# Patient Record
Sex: MALE | Race: BLACK OR AFRICAN AMERICAN | Employment: FULL TIME | URBAN - METROPOLITAN AREA
[De-identification: names, ages, dates, MRNs, and addresses within clinical notes are randomized per-mention and may not be internally consistent; named-entity substitution may affect disease eponyms.]

---

## 2018-03-11 NOTE — ED INITIAL ASSESSMENT (HPI)
Pt has a history of bipolar disorder and he feels as though a manic phase of his disorder is happening. He is not sleeping well, only 2-3 hour last night. He was hospitalized in 2012 for mental illness.

## 2018-03-12 PROBLEM — F31.74 BIPOLAR 1 DISORDER, MANIC, FULL REMISSION (HCC): Status: ACTIVE | Noted: 2018-03-12

## 2018-03-12 NOTE — ED NOTES
[facility] ambulance they let me know it will be an additional hour, let them know know it was inappropriate that we never received a call regarding delay.  They will look to turnover

## 2018-03-12 NOTE — ED NOTES
Miley from [facility] at bedside updating pt and family of the POC, plan to admit to [facility]

## 2018-03-12 NOTE — ED NOTES
Called [facility] dept, spoke to Reynaldo. She was updated of pt status/meds given

## 2018-03-12 NOTE — BH LEVEL OF CARE ASSESSMENT
Level of Care Assessment Note    General Questions  Why are you here?: \"I have had bipolar disorder for awhile. \"    Precipitating Events: Pt stated that his wife and friends \"collectively\" decided that he needed to be evaluated.  Pt stated that he was b snap-chatted himself driving [id number] on Friday.   Pt has been texting other females with sexual content-specifically to family friends and friends wives, emailed a dealership about a new car and then when he got an autoreply he sent a vulgar reply saying angi Wife stated that pt stated in 2012 \"I don't understand why I could have a beautiful baby and beautiful wife and not want to live anymore. \"      Danger to Others/Property  Current/Recent Harm Toward Others: No  Past Harm Toward Others: No  Current or Past falling asleep  Number of Sleep Hours: 3 Hours  Use of Sleep Aids: 100mg trazadone. Pt stated that he has been taking double dose of trazadone.     Appetite Symptoms:  (MARCELINO)  Unplanned Weight Loss:  (MARCELINO)  Unplanned Weight Gain:  (MARCELINO)  History of Eating Lennette Long any of the following behaviors over the past 30 days?: Denies                                              Functional Impairment  Currently Attending School: No  Employment Status: Employed  Job Issues:  (Per pt's wife, no work problems at this time.  )  C Excited;Disorganized;Racing;Flight of ideas;Perseveration;Pressured; Tangential  Content: Grandiose  Level of Consciousness: Alert  Level of Consciousness: Alert  Behavior  Exhibited behavior: Hyperactive; Unable to participate    Assessment Summary  Assessm recently been sober) and admitted to smoking marijuana daily. He became very agittated at this writer when explaining assigned behavioral health unit.   Pt currently is being treated by Dr. Jeanne Pickett on an outpatient basis and Dr. Jeanne Pickett stated that they have bee

## 2018-03-12 NOTE — ED NOTES
Rounded on pt, he remains asleep. Pt opens eyes to verbal stimuli, falls back to sleep. Resps easy, regular.  Pt awaiting [facility] transport to [facility]

## 2018-03-12 NOTE — ED NOTES
Pt noted hyperverbal, yelling loudly, banging on the door, not compliant with verbal cues. Security at bedside.  Pt given Haldol 5 mg IV, see MAR

## 2018-03-12 NOTE — ED NOTES
Patient was started on seclusion per shannon ledezma. Belongings placed in two locked bags ([id number] & [id number]) Wife took home cell phone and .

## 2018-03-12 NOTE — ED PROVIDER NOTES
Patient Seen in: [facility] Emergency Department    History   Patient presents with:  Eval-P (psychiatric)    Stated Complaint: eval P  --hx of bipolar starting to feel manic    HPI    Patient is a 65-year-old male who presents emergency room with a (Temporal)   Resp 18   Ht 189.2 cm (6' 2.5\")   Wt 89.4 kg   SpO2 100%   BMI 24.95 kg/m²         Physical Exam  GENERAL: Well-developed, well-nourished male sitting up breathing easily in no apparent distress. Patient is nontoxic in appearance.   HEENT: He DIFFERENTIAL WITH PLATELET    Narrative: The following orders were created for panel order CBC WITH DIFFERENTIAL WITH PLATELET.   Procedure                               Abnormality         Status                     ---------

## 2021-03-26 RX ORDER — ACETAMINOPHEN 500 MG
1000 TABLET ORAL ONCE
Status: CANCELLED | OUTPATIENT
Start: 2021-03-26 | End: 2021-03-26

## 2021-03-26 NOTE — H&P (VIEW-ONLY)
Prakash Collins is a 31year old male. Patient referred from Physician Nonstaff.        Patient presents for complaints of pain and a bulge in the midline just above the umbilical region  It is non reducible    Past Medical History:   Diagnosis Date   • Bipolar

## 2021-04-14 ENCOUNTER — LAB ENCOUNTER (OUTPATIENT)
Dept: LAB | Age: 36
End: 2021-04-14
Attending: SURGERY
Payer: COMMERCIAL

## 2021-04-14 DIAGNOSIS — Z01.818 PRE-OP TESTING: ICD-10-CM

## 2021-04-16 ENCOUNTER — ANESTHESIA (OUTPATIENT)
Dept: SURGERY | Facility: HOSPITAL | Age: 36
End: 2021-04-16
Payer: COMMERCIAL

## 2021-04-16 ENCOUNTER — ANESTHESIA EVENT (OUTPATIENT)
Dept: SURGERY | Facility: HOSPITAL | Age: 36
End: 2021-04-16
Payer: COMMERCIAL

## 2021-04-16 ENCOUNTER — HOSPITAL ENCOUNTER (OUTPATIENT)
Facility: HOSPITAL | Age: 36
Setting detail: HOSPITAL OUTPATIENT SURGERY
Discharge: HOME | End: 2021-04-16
Attending: SURGERY | Admitting: SURGERY
Payer: COMMERCIAL

## 2021-04-16 VITALS
HEIGHT: 74 IN | OXYGEN SATURATION: 100 % | DIASTOLIC BLOOD PRESSURE: 75 MMHG | TEMPERATURE: 97 F | SYSTOLIC BLOOD PRESSURE: 112 MMHG | RESPIRATION RATE: 16 BRPM | BODY MASS INDEX: 28.88 KG/M2 | WEIGHT: 225 LBS | HEART RATE: 45 BPM

## 2021-04-16 DIAGNOSIS — K43.9 EPIGASTRIC HERNIA: ICD-10-CM

## 2021-04-16 DIAGNOSIS — Z01.818 PRE-OP TESTING: Primary | ICD-10-CM

## 2021-04-16 PROCEDURE — 0WUF0JZ SUPPLEMENT ABDOMINAL WALL WITH SYNTHETIC SUBSTITUTE, OPEN APPROACH: ICD-10-PCS | Performed by: SURGERY

## 2021-04-16 DEVICE — VENTRALEX ST HERNIA PATCH
Type: IMPLANTABLE DEVICE | Site: ABDOMEN | Status: FUNCTIONAL
Brand: VENTRALEX ST HERNIA PATCH

## 2021-04-16 RX ORDER — LIDOCAINE HYDROCHLORIDE AND EPINEPHRINE 10; 10 MG/ML; UG/ML
INJECTION, SOLUTION INFILTRATION; PERINEURAL AS NEEDED
Status: DISCONTINUED | OUTPATIENT
Start: 2021-04-16 | End: 2021-04-16 | Stop reason: HOSPADM

## 2021-04-16 RX ORDER — SODIUM CHLORIDE, SODIUM LACTATE, POTASSIUM CHLORIDE, CALCIUM CHLORIDE 600; 310; 30; 20 MG/100ML; MG/100ML; MG/100ML; MG/100ML
INJECTION, SOLUTION INTRAVENOUS CONTINUOUS
Status: DISCONTINUED | OUTPATIENT
Start: 2021-04-16 | End: 2021-04-16

## 2021-04-16 RX ORDER — HYDROCODONE BITARTRATE AND ACETAMINOPHEN 5; 325 MG/1; MG/1
2 TABLET ORAL AS NEEDED
Status: DISCONTINUED | OUTPATIENT
Start: 2021-04-16 | End: 2021-04-16

## 2021-04-16 RX ORDER — HYDROMORPHONE HYDROCHLORIDE 1 MG/ML
0.4 INJECTION, SOLUTION INTRAMUSCULAR; INTRAVENOUS; SUBCUTANEOUS EVERY 5 MIN PRN
Status: DISCONTINUED | OUTPATIENT
Start: 2021-04-16 | End: 2021-04-16

## 2021-04-16 RX ORDER — MEPERIDINE HYDROCHLORIDE 25 MG/ML
12.5 INJECTION INTRAMUSCULAR; INTRAVENOUS; SUBCUTANEOUS AS NEEDED
Status: DISCONTINUED | OUTPATIENT
Start: 2021-04-16 | End: 2021-04-16

## 2021-04-16 RX ORDER — ONDANSETRON 2 MG/ML
4 INJECTION INTRAMUSCULAR; INTRAVENOUS AS NEEDED
Status: DISCONTINUED | OUTPATIENT
Start: 2021-04-16 | End: 2021-04-16

## 2021-04-16 RX ORDER — MIDAZOLAM HYDROCHLORIDE 1 MG/ML
1 INJECTION INTRAMUSCULAR; INTRAVENOUS EVERY 5 MIN PRN
Status: DISCONTINUED | OUTPATIENT
Start: 2021-04-16 | End: 2021-04-16

## 2021-04-16 RX ORDER — DEXAMETHASONE SODIUM PHOSPHATE 4 MG/ML
VIAL (ML) INJECTION AS NEEDED
Status: DISCONTINUED | OUTPATIENT
Start: 2021-04-16 | End: 2021-04-16 | Stop reason: SURG

## 2021-04-16 RX ORDER — HYDROCODONE BITARTRATE AND ACETAMINOPHEN 5; 325 MG/1; MG/1
1 TABLET ORAL AS NEEDED
Status: DISCONTINUED | OUTPATIENT
Start: 2021-04-16 | End: 2021-04-16

## 2021-04-16 RX ORDER — MIDAZOLAM HYDROCHLORIDE 1 MG/ML
INJECTION INTRAMUSCULAR; INTRAVENOUS AS NEEDED
Status: DISCONTINUED | OUTPATIENT
Start: 2021-04-16 | End: 2021-04-16 | Stop reason: SURG

## 2021-04-16 RX ORDER — HYDROMORPHONE HYDROCHLORIDE 1 MG/ML
INJECTION, SOLUTION INTRAMUSCULAR; INTRAVENOUS; SUBCUTANEOUS
Status: COMPLETED
Start: 2021-04-16 | End: 2021-04-16

## 2021-04-16 RX ORDER — BUPIVACAINE HYDROCHLORIDE 5 MG/ML
INJECTION, SOLUTION EPIDURAL; INTRACAUDAL AS NEEDED
Status: DISCONTINUED | OUTPATIENT
Start: 2021-04-16 | End: 2021-04-16 | Stop reason: HOSPADM

## 2021-04-16 RX ORDER — ROCURONIUM BROMIDE 10 MG/ML
INJECTION, SOLUTION INTRAVENOUS AS NEEDED
Status: DISCONTINUED | OUTPATIENT
Start: 2021-04-16 | End: 2021-04-16 | Stop reason: SURG

## 2021-04-16 RX ORDER — NEOSTIGMINE METHYLSULFATE 1 MG/ML
INJECTION INTRAVENOUS AS NEEDED
Status: DISCONTINUED | OUTPATIENT
Start: 2021-04-16 | End: 2021-04-16 | Stop reason: SURG

## 2021-04-16 RX ORDER — ONDANSETRON 2 MG/ML
INJECTION INTRAMUSCULAR; INTRAVENOUS AS NEEDED
Status: DISCONTINUED | OUTPATIENT
Start: 2021-04-16 | End: 2021-04-16 | Stop reason: SURG

## 2021-04-16 RX ORDER — METOCLOPRAMIDE HYDROCHLORIDE 5 MG/ML
10 INJECTION INTRAMUSCULAR; INTRAVENOUS AS NEEDED
Status: DISCONTINUED | OUTPATIENT
Start: 2021-04-16 | End: 2021-04-16

## 2021-04-16 RX ORDER — KETOROLAC TROMETHAMINE 30 MG/ML
INJECTION, SOLUTION INTRAMUSCULAR; INTRAVENOUS AS NEEDED
Status: DISCONTINUED | OUTPATIENT
Start: 2021-04-16 | End: 2021-04-16 | Stop reason: SURG

## 2021-04-16 RX ORDER — CEFAZOLIN SODIUM/WATER 2 G/20 ML
2 SYRINGE (ML) INTRAVENOUS ONCE
Status: COMPLETED | OUTPATIENT
Start: 2021-04-16 | End: 2021-04-16

## 2021-04-16 RX ORDER — GLYCOPYRROLATE 0.2 MG/ML
INJECTION, SOLUTION INTRAMUSCULAR; INTRAVENOUS AS NEEDED
Status: DISCONTINUED | OUTPATIENT
Start: 2021-04-16 | End: 2021-04-16 | Stop reason: SURG

## 2021-04-16 RX ORDER — LIDOCAINE HYDROCHLORIDE 10 MG/ML
INJECTION, SOLUTION EPIDURAL; INFILTRATION; INTRACAUDAL; PERINEURAL AS NEEDED
Status: DISCONTINUED | OUTPATIENT
Start: 2021-04-16 | End: 2021-04-16 | Stop reason: SURG

## 2021-04-16 RX ORDER — HYDROCODONE BITARTRATE AND ACETAMINOPHEN 5; 325 MG/1; MG/1
1 TABLET ORAL EVERY 4 HOURS PRN
Qty: 20 TABLET | Refills: 0 | Status: SHIPPED | OUTPATIENT
Start: 2021-04-16 | End: 2021-04-28

## 2021-04-16 RX ORDER — NALOXONE HYDROCHLORIDE 0.4 MG/ML
80 INJECTION, SOLUTION INTRAMUSCULAR; INTRAVENOUS; SUBCUTANEOUS AS NEEDED
Status: DISCONTINUED | OUTPATIENT
Start: 2021-04-16 | End: 2021-04-16

## 2021-04-16 RX ORDER — CEFAZOLIN SODIUM/WATER 2 G/20 ML
SYRINGE (ML) INTRAVENOUS
Status: DISCONTINUED
Start: 2021-04-16 | End: 2021-04-16

## 2021-04-16 RX ADMIN — SODIUM CHLORIDE, SODIUM LACTATE, POTASSIUM CHLORIDE, CALCIUM CHLORIDE: 600; 310; 30; 20 INJECTION, SOLUTION INTRAVENOUS at 10:08:00

## 2021-04-16 RX ADMIN — ROCURONIUM BROMIDE 40 MG: 10 INJECTION, SOLUTION INTRAVENOUS at 09:32:00

## 2021-04-16 RX ADMIN — LIDOCAINE HYDROCHLORIDE 30 MG: 10 INJECTION, SOLUTION EPIDURAL; INFILTRATION; INTRACAUDAL; PERINEURAL at 09:32:00

## 2021-04-16 RX ADMIN — DEXAMETHASONE SODIUM PHOSPHATE 8 MG: 4 MG/ML VIAL (ML) INJECTION at 09:41:00

## 2021-04-16 RX ADMIN — MIDAZOLAM HYDROCHLORIDE 2 MG: 1 INJECTION INTRAMUSCULAR; INTRAVENOUS at 09:27:00

## 2021-04-16 RX ADMIN — ONDANSETRON 4 MG: 2 INJECTION INTRAMUSCULAR; INTRAVENOUS at 09:48:00

## 2021-04-16 RX ADMIN — NEOSTIGMINE METHYLSULFATE 5 MG: 1 INJECTION INTRAVENOUS at 09:55:00

## 2021-04-16 RX ADMIN — CEFAZOLIN SODIUM/WATER 2 G: 2 G/20 ML SYRINGE (ML) INTRAVENOUS at 09:35:00

## 2021-04-16 RX ADMIN — SODIUM CHLORIDE, SODIUM LACTATE, POTASSIUM CHLORIDE, CALCIUM CHLORIDE: 600; 310; 30; 20 INJECTION, SOLUTION INTRAVENOUS at 09:27:00

## 2021-04-16 RX ADMIN — KETOROLAC TROMETHAMINE 30 MG: 30 INJECTION, SOLUTION INTRAMUSCULAR; INTRAVENOUS at 09:49:00

## 2021-04-16 RX ADMIN — GLYCOPYRROLATE 0.8 MG: 0.2 INJECTION, SOLUTION INTRAMUSCULAR; INTRAVENOUS at 09:55:00

## 2021-04-16 NOTE — ANESTHESIA PREPROCEDURE EVALUATION
PRE-OP EVALUATION    Patient Name: Manda Morris    Admit Diagnosis: Epigastric hernia [K43.9]    Pre-op Diagnosis: Epigastric hernia [K43.9]    REPAIR EPIGASTRIC HERNIA WITH MESH    Anesthesia Procedure: REPAIR EPIGASTRIC HERNIA WITH MESH (N/A ) UNLISTED      right knee cartilage   • OTHER      wisdom teeth     Social History    Tobacco Use      Smoking status: Never Smoker      Smokeless tobacco: Never Used    Alcohol use: Yes      Comment: week ends      Drug use:    Types: Cannabis   Comment: d

## 2021-04-16 NOTE — BRIEF OP NOTE
Pre-Operative Diagnosis: Epigastric hernia [K43.9]     Post-Operative Diagnosis: Epigastric hernia [K43.9]      Procedure Performed:   REPAIR EPIGASTRIC HERNIA WITH MESH    Surgeon(s) and Role:     * Jean Porter MD - Primary    Assistant(s):  Surgical

## 2021-04-16 NOTE — ANESTHESIA POSTPROCEDURE EVALUATION
[address] [place] [address] Patient Status:  Hospital Outpatient Surgery   Age/Gender 31year old male MRN [id number]   [facility] SURGERY Attending Raven Simeon MD   [facility] Day # 0 PCP Jessica Mijares MD       Anesthesia Post-op

## 2021-04-16 NOTE — INTERVAL H&P NOTE
Pre-op Diagnosis: Epigastric hernia [K43.9]    The above referenced H&P was reviewed by Kamran Vargas MD on 4/16/2021, the patient was examined and no significant changes have occurred in the patient's condition since the H&P was performed.   I discussed w

## 2021-04-16 NOTE — ANESTHESIA PROCEDURE NOTES
Airway  Date/Time: 4/16/2021 9:33 AM  Urgency: elective    Airway not difficult    General Information and Staff    Patient location during procedure: OR  Anesthesiologist: Ashely Reed MD  Performed: anesthesiologist     Indications and Patient [place]

## 2021-04-16 NOTE — OPERATIVE REPORT
[facility]    PATIENT'S NAME: Esther FRANCO   ATTENDING PHYSICIAN: Peggy Nieves M.D. OPERATING PHYSICIAN: Peggy Nieves M.D.    PATIENT ACCOUNT#:   [de-identified]    LOCATION:  [address] [place] [address] 13 [address]  MEDICAL RECORD #:   [id number]

## 2023-10-30 ENCOUNTER — HOSPITAL ENCOUNTER (OUTPATIENT)
Age: 38
Discharge: HOME | End: 2023-10-30
Attending: EMERGENCY MEDICINE
Payer: COMMERCIAL

## 2023-10-30 VITALS
WEIGHT: 204 LBS | RESPIRATION RATE: 18 BRPM | TEMPERATURE: 98 F | HEART RATE: 66 BPM | SYSTOLIC BLOOD PRESSURE: 107 MMHG | OXYGEN SATURATION: 100 % | BODY MASS INDEX: 26 KG/M2 | DIASTOLIC BLOOD PRESSURE: 61 MMHG

## 2023-10-30 DIAGNOSIS — Z20.2 POSSIBLE EXPOSURE TO STD: Primary | ICD-10-CM

## 2023-10-30 LAB
BILIRUB UR QL STRIP: NEGATIVE
CLARITY UR: CLEAR
COLOR UR: YELLOW
GLUCOSE UR STRIP-MCNC: NEGATIVE MG/DL
KETONES UR STRIP-MCNC: NEGATIVE MG/DL
LEUKOCYTE ESTERASE UR QL STRIP: NEGATIVE
NITRITE UR QL STRIP: NEGATIVE
PH UR STRIP: 7.5 [PH]
PROT UR STRIP-MCNC: 30 MG/DL
SP GR UR STRIP: 1.01
UROBILINOGEN UR STRIP-ACNC: <2 MG/DL

## 2023-10-30 PROCEDURE — 99212 OFFICE O/P EST SF 10 MIN: CPT

## 2023-10-30 PROCEDURE — 87491 CHLMYD TRACH DNA AMP PROBE: CPT | Performed by: EMERGENCY MEDICINE

## 2023-10-30 PROCEDURE — 87591 N.GONORRHOEAE DNA AMP PROB: CPT | Performed by: EMERGENCY MEDICINE

## 2023-10-30 PROCEDURE — 99203 OFFICE O/P NEW LOW 30 MIN: CPT

## 2023-10-30 PROCEDURE — 81002 URINALYSIS NONAUTO W/O SCOPE: CPT

## 2023-10-30 NOTE — ED INITIAL ASSESSMENT (HPI)
Pt states he was told 3 days ago that his partner tested for chlamydia last week. Pt denies any symptoms at this time. Denies burning or discharge at this time.

## 2023-10-31 LAB
C TRACH DNA SPEC QL NAA+PROBE: NEGATIVE
N GONORRHOEA DNA SPEC QL NAA+PROBE: NEGATIVE

## (undated) DEVICE — SUTURE VICRYL 3-0 SH

## (undated) DEVICE — SOL  .9 1000ML BTL

## (undated) DEVICE — SUTURE MONOCRYL 4-0 PS-2

## (undated) DEVICE — BREAST-HERNIA-PORT CDS-LF: Brand: MEDLINE INDUSTRIES, INC.

## (undated) DEVICE — CHLORAPREP 26ML APPLICATOR

## (undated) DEVICE — SUTURE PDS II 2-0 CT-2

## (undated) DEVICE — LIGHT HANDLE

## (undated) DEVICE — STERILE SYNTHETIC POLYISOPRENE POWDER-FREE SURGICAL GLOVES WITH HYDROGEL COATING, SMOOTH FINISH, STRAIGHT FINGER: Brand: PROTEXIS

## (undated) DEVICE — SCD SLEEVE KNEE HI BLEND

## (undated) NOTE — LETTER
[facility]  Phone: [phone]  Right Fax: [phone]    ADDRESSEE INFORMATION: SENDER INFORMATION:   To:   Dr Nima Ho From: Tammi Dia RN     Department: [address]   Fax Number: [phone] Date:   3/26/2021     Ph If you have received this communication in error, please immediately notify us by telephone and return the original message to us at [address] via the [facility]